# Patient Record
Sex: FEMALE | Race: WHITE
[De-identification: names, ages, dates, MRNs, and addresses within clinical notes are randomized per-mention and may not be internally consistent; named-entity substitution may affect disease eponyms.]

---

## 2017-09-21 ENCOUNTER — HOSPITAL ENCOUNTER (OUTPATIENT)
Dept: HOSPITAL 61 - SURG | Age: 37
Discharge: HOME | End: 2017-09-21
Attending: OBSTETRICS & GYNECOLOGY
Payer: COMMERCIAL

## 2017-09-21 VITALS — SYSTOLIC BLOOD PRESSURE: 146 MMHG | DIASTOLIC BLOOD PRESSURE: 76 MMHG

## 2017-09-21 VITALS — BODY MASS INDEX: 33.49 KG/M2 | HEIGHT: 63 IN | WEIGHT: 189 LBS

## 2017-09-21 DIAGNOSIS — Z30.2: Primary | ICD-10-CM

## 2017-09-21 DIAGNOSIS — E66.9: ICD-10-CM

## 2017-09-21 DIAGNOSIS — Z88.0: ICD-10-CM

## 2017-09-21 DIAGNOSIS — Z72.0: ICD-10-CM

## 2017-09-21 LAB
NEG OBC UR: (no result)
POS OBC UR: (no result)

## 2017-09-21 PROCEDURE — 58565 HYSTEROSCOPY STERILIZATION: CPT

## 2017-09-21 PROCEDURE — 81025 URINE PREGNANCY TEST: CPT

## 2017-09-21 PROCEDURE — A4264 INTRATUBAL OCCLUSION DEVICE: HCPCS

## 2017-09-21 NOTE — DISCH
DISCHARGE INSTRUCTIONS


Condition on Discharge


Condition on Discharge:  Stable





Activity After Discharge


Activity Instructions for Disc:  Activity as tolerated


Lifting Instructions after Dis:  No heavy lifting


Driving Instructions after Dis:  Do not drive today





Diet after Discharge


Diet after Discharge:  Regular





Contacting the DRJose Alejandro after DC


Call your doctor for:  Concerns you may have





Follow-Up


Follow up with:  Dr. Lovett in 1 week.











ADELINE LOVETT Jr, MD Sep 21, 2017 10:41

## 2017-09-21 NOTE — PDOC
BRIEF OPERATIVE NOTE


Pre-Op Diagnosis


Sterilization


Post-Op Diagnosis


Same


Procedure Performed


Essure


Surgeon


Dr. Lovett


Anesthesia Type:  General


Blood Loss


less than 5 ml


Specimens Obtained


none


Findings


nml size uterus, nml fallopian tube ostia


Complications


none











ADELINE LOVETT Jr, MD Sep 21, 2017 10:40

## 2017-09-21 NOTE — OP
DATE OF SURGERY:  



PREOPERATIVE DIAGNOSIS:  Sterilization.



POSTOPERATIVE DIAGNOSIS:  Sterilization.



PROCEDURE: Essure.



SURGEON:  Adeline Lovett MD



ANESTHESIA:  GETA.



ESTIMATED BLOOD LOSS:  Less than 5 mL.



COMPLICATIONS:  None.



FINDINGS:  Normal size uterus, normal fallopian tube ostia bilaterally.



SUMMARY:  A 37-year-old female, desires permanent sterilization.  She was

counseled on the Essure procedure risks, benefits and expectations and voiced a

clear understanding to proceed.



DESCRIPTION OF PROCEDURE:  The patient was taken to surgery suite and placed in

dorsal lithotomy position.  She was prepped with Betadine solution and draped in

a sterile fashion.  After adequate anesthesia, weighted speculum and curved

Ira placed vaginally and anterior lip of the cervix grasped with a

single-tooth tenaculum, the cervix was dilated with Macrina dilators up to size 6,

the hysteroscope was then placed.  The left fallopian tube and ostia was

identified.  She Essure device was placed in normal fashion with 3 coils on the

external fallopian tube ostia.  Same process took place with right fallopian

tube with 3 coils demonstrated on the external fallopian tube ostia.  The

hysteroscope was removed.  Single-tooth tenaculum and weighted speculum were

removed.  The patient tolerated the procedure well and was taken to recovery

room in stable condition.  Sponge count correct x 3.

 



______________________________

ADELINE LOVETT MD



DR:  MILLY/nelsy  JOB#:  [id number] / [id number]

DD:  09/21/2017 10:40  DT:  09/21/2017 10:54

## 2018-01-18 ENCOUNTER — HOSPITAL ENCOUNTER (OUTPATIENT)
Dept: HOSPITAL 61 - RAD | Age: 38
Discharge: HOME | End: 2018-01-18
Attending: OBSTETRICS & GYNECOLOGY
Payer: COMMERCIAL

## 2018-01-18 DIAGNOSIS — N93.8: Primary | ICD-10-CM

## 2018-01-18 LAB
NEG OBC UR: (no result)
POS OBC UR: (no result)
U PREG PATIENT: NEGATIVE

## 2018-01-18 PROCEDURE — 58340 CATHETER FOR HYSTEROGRAPHY: CPT

## 2018-01-18 PROCEDURE — 81025 URINE PREGNANCY TEST: CPT

## 2018-01-18 PROCEDURE — 74740 X-RAY FEMALE GENITAL TRACT: CPT

## 2018-01-18 RX ADMIN — IOHEXOL 1 ML: 300 INJECTION, SOLUTION INTRAVENOUS at 11:02

## 2019-10-14 ENCOUNTER — HOSPITAL ENCOUNTER (OUTPATIENT)
Dept: HOSPITAL 63 - US | Age: 39
Discharge: HOME | End: 2019-10-14
Payer: COMMERCIAL

## 2019-10-14 DIAGNOSIS — Z01.419: Primary | ICD-10-CM

## 2019-10-14 DIAGNOSIS — R10.2: ICD-10-CM

## 2019-10-14 DIAGNOSIS — E34.9: ICD-10-CM

## 2019-10-14 PROCEDURE — 76856 US EXAM PELVIC COMPLETE: CPT

## 2019-10-14 PROCEDURE — 76830 TRANSVAGINAL US NON-OB: CPT

## 2019-10-14 NOTE — RAD
Pt up and down from bathroom frequently. Denies heavy bleeding at this time, does state some spotting is present.   US PELVIS W/TV

 

History: Pelvic pain, Essure devices for 2 years

 

Comparison: None.

 

Findings:

Multiple transabdominal sonographic images of the pelvis are submitted. 

Uterus measured 10.6 x 4.6 x 5.8 cm. Endometrium measured 0.37 m in 

thickness. Right ovary measured 2.7 x 2.5 x 3.3 cm. Left ovary measured 

3.1 x 2.2 x 1.4 cm.

 

Transvaginal ultrasound: Multiple transvaginal sonographic images of 

pelvis are similar. Right ovary measured 3.3 x 2.3 x 1.9 cm with normal 

color flow and low resistance vascularity. Endometrium measured about 0.6 

cm in greatest thickness. No free fluid is demonstrated. There is normal 

low resistance vascularity of the left ovary. Transabdominal images of 

left ovary demonstrate measurements of 3 x 1.6 x 2.9 cm.

 

Impression: 

 

1.  No significant abnormality is demonstrated.

 

Electronically signed by: Rigoberto Rhodes MD (10/14/2019 5:26 PM) 

[id number]